# Patient Record
Sex: MALE | Race: WHITE | Employment: FULL TIME | URBAN - METROPOLITAN AREA
[De-identification: names, ages, dates, MRNs, and addresses within clinical notes are randomized per-mention and may not be internally consistent; named-entity substitution may affect disease eponyms.]

---

## 2020-10-30 ENCOUNTER — OFFICE VISIT (OUTPATIENT)
Dept: HEMATOLOGY | Age: 55
End: 2020-10-30

## 2020-10-30 VITALS
SYSTOLIC BLOOD PRESSURE: 121 MMHG | DIASTOLIC BLOOD PRESSURE: 87 MMHG | HEIGHT: 67 IN | WEIGHT: 214.2 LBS | HEART RATE: 75 BPM | BODY MASS INDEX: 33.62 KG/M2 | RESPIRATION RATE: 16 BRPM | TEMPERATURE: 98.6 F | OXYGEN SATURATION: 97 %

## 2020-10-30 DIAGNOSIS — K76.0 NAFLD (NONALCOHOLIC FATTY LIVER DISEASE): Primary | ICD-10-CM

## 2020-10-30 PROBLEM — E78.5 DYSLIPIDEMIA: Status: ACTIVE | Noted: 2020-10-30

## 2020-10-30 PROBLEM — F33.42 RECURRENT MAJOR DEPRESSIVE DISORDER, IN FULL REMISSION (HCC): Status: ACTIVE | Noted: 2020-10-30

## 2020-10-30 PROBLEM — I10 ESSENTIAL HYPERTENSION: Status: ACTIVE | Noted: 2020-10-30

## 2020-10-30 PROCEDURE — 91200 LIVER ELASTOGRAPHY: CPT | Performed by: NURSE PRACTITIONER

## 2020-10-30 PROCEDURE — 99202 OFFICE O/P NEW SF 15 MIN: CPT | Performed by: NURSE PRACTITIONER

## 2020-10-30 RX ORDER — GUAIFENESIN AND PHENYLEPHRINE HCL 400; 10 MG/1; MG/1
TABLET ORAL
COMMUNITY

## 2020-10-30 RX ORDER — CHOLECALCIFEROL (VITAMIN D3) 125 MCG
CAPSULE ORAL
COMMUNITY

## 2020-10-30 RX ORDER — LEVOFLOXACIN 500 MG/1
TABLET, FILM COATED ORAL
COMMUNITY

## 2020-10-30 RX ORDER — GLUCOSAMINE/D3/BOSWELLIA SERRA 1500MG-400
TABLET ORAL
COMMUNITY

## 2020-10-30 RX ORDER — LORATADINE AND PSEUDOEPHEDRINE SULFATE 10; 240 MG/1; MG/1
TABLET, EXTENDED RELEASE ORAL
COMMUNITY

## 2020-10-30 RX ORDER — ATORVASTATIN CALCIUM 20 MG/1
TABLET, FILM COATED ORAL
COMMUNITY

## 2020-10-30 RX ORDER — MELATONIN 2.5MG/10ML
LIQUID (ML) ORAL
COMMUNITY

## 2020-10-30 RX ORDER — BISMUTH SUBSALICYLATE 262 MG
1 TABLET,CHEWABLE ORAL DAILY
COMMUNITY

## 2020-10-30 RX ORDER — BUPROPION HYDROCHLORIDE 200 MG/1
400 TABLET, EXTENDED RELEASE ORAL DAILY
COMMUNITY
Start: 2020-08-18

## 2020-10-30 RX ORDER — LISINOPRIL 20 MG/1
TABLET ORAL
COMMUNITY

## 2020-10-30 RX ORDER — PHENTERMINE HYDROCHLORIDE 37.5 MG/1
CAPSULE ORAL
COMMUNITY
Start: 2020-10-19

## 2020-10-30 RX ORDER — ACETAMINOPHEN 160 MG/5ML
SUSPENSION, ORAL (FINAL DOSE FORM) ORAL
COMMUNITY

## 2020-10-30 NOTE — PROGRESS NOTES
Identified pt with two pt identifiers(name and ). Reviewed record in preparation for visit and have obtained necessary documentation. Chief Complaint   Patient presents with    Other     Fibroscan Only      Vitals:    10/30/20 1611   BP: 121/87   Pulse: 75   Resp: 16   Temp: 98.6 °F (37 °C)   TempSrc: Temporal   SpO2: 97%   Weight: 214 lb 3.2 oz (97.2 kg)   Height: 5' 7\" (1.702 m)   PainSc:   0 - No pain       Health Maintenance Review: Patient reminded of \"due or due soon\" health maintenance. I have asked the patient to contact his/her primary care provider (PCP) for follow-up on his/her health maintenance. Coordination of Care Questionnaire:  :   1) Have you been to an emergency room, urgent care, or hospitalized since your last visit? If yes, where when, and reason for visit? no       2. Have seen or consulted any other health care provider since your last visit? If yes, where when, and reason for visit? NO      Patient is accompanied by self I have received verbal consent from Lilian Sauceda to discuss any/all medical information while they are present in the room.

## 2020-10-30 NOTE — PROGRESS NOTES
[address] [facility]      Maureen Rollins MD, Emiliana Ortiz, Tyson Cornelius MD, MPH      IRAJ Davis, [facility]     Salma Cartwright, [facility]   Alina Thornton DENISSE-BRONWYN Stanley, [facility]       Jeovany Romero [facility] [address]    at [address], [address] Jose Alberto Wu  22.    [phone]    FAX: [address] [place] [address] Avenue    [address], [address], [address]    [phone]    FAX: [phone]           Patient Care Team:  Ab Velásquez MD as PCP - General (Family Medicine)      Problem List  Date Reviewed: 12/23/2016          Codes Class Noted    NAFLD (nonalcoholic fatty liver disease) ICD-10-CM: K76.0  ICD-9-CM: 571.8  11/1/2020        Dyslipidemia ICD-10-CM: E78.5  ICD-9-CM: 272.4  10/30/2020        Essential hypertension ICD-10-CM: I10  ICD-9-CM: 401.9  10/30/2020        Recurrent major depressive disorder, in full remission [facility]) ICD-10-CM: F33.42  ICD-9-CM: 296.36  10/30/2020        Fracture of left olecranon process ICD-10-CM: S52.022A  ICD-9-CM: 813.01  12/23/2016            The physicians listed above have asked me to see Colin Poole in consultation regarding elevated liver enzymes and to perform assessment of fibrosis by means of in-office fibroscan analysis. The patient is a 53 y.o.  male who was found to have liver disease in 2010. The patient reports having a liver biopsy in 2010 which demonstrated fatty liver. The patient has no symptoms which can be attributed to the liver disorder. The patient is not currently experiencing the following symptoms of liver disease:    fatigue, pain in the right side over the liver, yellowing of the eyes or skin, itching, or swelling of the abdomen.      The patient completes all daily activities without any functional limitations. ASSESSMENT AND PLAN:  NAFLD with stage 1 portal fibrosis. Assessment of liver fibrosis was performed with Fibroscan in the office today. The result was 6.9 kPa which correlates with stage 1 portal fibrosis. The CAP score of 303 suggests fatty liver. A copy of the report was provided to the patient and will be forwarded to the referring medical practice. All questions were answered. The patient expressed a clear understanding of the above. ALLERGIES:  Allergies   Allergen Reactions    Erythromycin Nausea Only    Thimerosal Other (comments)     EYES RED       MEDICATIONS:  Current Outpatient Medications   Medication Sig    loratadine-pseudoephedrine (Claritin-D 24 Hour)  mg per tablet Claritin-D 24 Hour   PRN    coenzyme Q-10 (Q-Sorb Co Q-10) 200 mg capsule Co Q-10   one po daily    Omega-3-DHA-EPA-Fish Oil (Fish Oil) 1,200 (144-216) mg cap Fish Oil   1200mg/360EHA/240DHa, ONE DAILY    glucosamine-D3-Boswellia serr (Osteo Bi-Flex, 5-Loxin,) 1,500-400-100 mg-unit-mg tab Osteo Bi-Flex   2 po daily    turmeric root extract 500 mg cap turmeric root extract 500 mg capsule   Take 1 capsule every day by oral route.  atorvastatin (LIPITOR) 20 mg tablet atorvastatin 20 mg tablet   Take 1 tablet every day by oral route.  buPROPion SR (WELLBUTRIN, ZYBAN) 200 mg SR tablet Take 400 mg by mouth daily.  levoFLOXacin (LEVAQUIN) 500 mg tablet levofloxacin 500 mg tablet   Take 1 tablet every day by oral route for 14 days.  lisinopriL (PRINIVIL, ZESTRIL) 20 mg tablet lisinopril 20 mg tablet    phentermine 37.5 mg capsule TAKE 1 CAPSULE BY MOUTH EVERY DAY AS NEEDED    naproxen sodium (Aleve) 220 mg cap Aleve 220 mg capsule   Take 1 capsule twice a day by oral route as needed.  multivitamin (ONE A DAY) tablet Take 1 Tab by mouth daily.  ibuprofen (MOTRIN) 200 mg tablet Take 200 mg by mouth.  Indications: PAIN    aspirin 81 mg tablet Take 81 mg by mouth daily. No current facility-administered medications for this visit. SYSTEM REVIEW NOT RELATED TO LIVER DISEASE OR REVIEWED ABOVE:  Constitution systems: Negative for fever, chills, weight gain, weight loss. Eyes: Negative for visual changes. ENT: Negative for sore throat, painful swallowing. Respiratory: Negative for cough, hemoptysis, SOB. Cardiology: Negative for chest pain, palpitations. GI:  Negative for constipation or diarrhea. : Negative for urinary frequency, dysuria, hematuria, nocturia. Skin: Negative for rash. Hematology: Negative for easy bruising, blood clots. Musculo-skelatal: Negative for back pain, muscle pain, weakness. Neurologic: Negative for headaches, dizziness, vertigo, memory problems not related to HE. Psychology: Negative for anxiety, depression. FAMILY HISTORY:  The father   of lung cancer. The mother  of cancer . There is no family history of liver disease. There is no family history of immune disorders. SOCIAL HISTORY:  The patient has never been . The patient has no children. The patient has never used tobacco products. The patient has never consumed significant amounts of alcohol. The patient currently works full time as school counselor. PHYSICAL EXAMINATION:  Visit Vitals  /87 (BP 1 Location: Right arm, BP Patient Position: Sitting)   Pulse 75   Temp 98.6 °F (37 °C) (Temporal)   Resp 16   Ht 5' 7\" (1.702 m)   Wt 214 lb 3.2 oz (97.2 kg)   SpO2 97%   BMI 33.55 kg/m²     General: No acute distress. Skin: No spider angiomata. No jaundice. No palmar erythema. Abdomen: Soft non-tender. Liver size normal to percussion/palpation. Spleen not palpable. No obvious ascites. Extremities: No edema. No muscle wasting. No gross arthritic changes. Neurologic: Alert and oriented. Cranial nerves grossly intact. No asterixis.     LABORATORY STUDIES:  From: 10/06/2020  AST/ALT/ALP/T Bili/ALB: 27/57/97/0.3/4.4  WBC/HB/PLT/INR: 6.2/14.8/237  NA/BUN/CREAT: 140/18/0.88    SEROLOGIES:  Not available or performed. Testing will be performed as indicated. LIVER HISTOLOGY:  10/2020. FibroScan performed at [address]. EkPa was 6.9. IQR/med 20%. . The results suggested a fibrosis level of F1. The CAP score suggests fatty liver. ENDOSCOPIC PROCEDURES:  Not available or performed    RADIOLOGY:  Not available or performed    OTHER TESTING:  Not available or performed    FOLLOW-UP:  All of the issues listed above in the Assessment and Plan were discussed with the patient. All questions were answered. The patient expressed a clear understanding of the above. The patient will follow-up with the referring physician. ERIC MercadoNP-BC  [address] [address] [address] [facility] [address] [id number] Omar William, [address] [place] [address] 22.  201 [facility]

## 2020-11-01 PROBLEM — R74.8 ELEVATED LIVER ENZYMES: Status: ACTIVE | Noted: 2020-11-01

## 2020-11-01 PROBLEM — K76.0 NAFLD (NONALCOHOLIC FATTY LIVER DISEASE): Status: ACTIVE | Noted: 2020-11-01

## 2022-03-19 PROBLEM — F33.42 RECURRENT MAJOR DEPRESSIVE DISORDER, IN FULL REMISSION (HCC): Status: ACTIVE | Noted: 2020-10-30

## 2022-03-19 PROBLEM — E78.5 DYSLIPIDEMIA: Status: ACTIVE | Noted: 2020-10-30

## 2022-03-19 PROBLEM — K76.0 NAFLD (NONALCOHOLIC FATTY LIVER DISEASE): Status: ACTIVE | Noted: 2020-11-01

## 2022-03-20 PROBLEM — I10 ESSENTIAL HYPERTENSION: Status: ACTIVE | Noted: 2020-10-30

## 2023-05-18 RX ORDER — IBUPROFEN 200 MG
200 TABLET ORAL
COMMUNITY

## 2023-05-18 RX ORDER — UBIDECARENONE 200 MG
CAPSULE ORAL
COMMUNITY

## 2023-05-18 RX ORDER — PHENTERMINE HYDROCHLORIDE 37.5 MG/1
CAPSULE ORAL
COMMUNITY
Start: 2020-10-19

## 2023-05-18 RX ORDER — LEVOFLOXACIN 500 MG/1
TABLET, FILM COATED ORAL
COMMUNITY

## 2023-05-18 RX ORDER — COVID-19 ANTIGEN TEST
KIT MISCELLANEOUS
COMMUNITY

## 2023-05-18 RX ORDER — ATORVASTATIN CALCIUM 20 MG/1
TABLET, FILM COATED ORAL
COMMUNITY

## 2023-05-18 RX ORDER — LISINOPRIL 20 MG/1
TABLET ORAL
COMMUNITY

## 2023-05-18 RX ORDER — BUPROPION HYDROCHLORIDE 200 MG/1
400 TABLET, EXTENDED RELEASE ORAL DAILY
COMMUNITY
Start: 2020-08-18

## 2023-05-18 RX ORDER — LORATADINE PSEUDOEPHEDRINE SULFATE 10; 240 MG/1; MG/1
TABLET, EXTENDED RELEASE ORAL
COMMUNITY

## 2023-06-27 ENCOUNTER — HOSPITAL ENCOUNTER (OUTPATIENT)
Age: 58
Discharge: HOME | End: 2023-06-30
Payer: COMMERCIAL

## 2023-06-27 DIAGNOSIS — R07.81 PLEURODYNIA: ICD-10-CM

## 2023-06-27 PROCEDURE — 71101 X-RAY EXAM UNILAT RIBS/CHEST: CPT

## 2024-07-02 ENCOUNTER — HOSPITAL ENCOUNTER (OUTPATIENT)
Facility: HOSPITAL | Age: 59
Discharge: HOME | End: 2024-07-05
Payer: COMMERCIAL

## 2024-07-02 DIAGNOSIS — M85.442: ICD-10-CM

## 2024-07-02 PROCEDURE — 73218 MRI UPPER EXTREMITY W/O DYE: CPT
